# Patient Record
Sex: FEMALE
[De-identification: names, ages, dates, MRNs, and addresses within clinical notes are randomized per-mention and may not be internally consistent; named-entity substitution may affect disease eponyms.]

---

## 2022-03-19 ENCOUNTER — TRANSCRIPTION ENCOUNTER (OUTPATIENT)
Age: 37
End: 2022-03-19

## 2022-03-26 ENCOUNTER — TRANSCRIPTION ENCOUNTER (OUTPATIENT)
Age: 37
End: 2022-03-26

## 2022-09-07 ENCOUNTER — NON-APPOINTMENT (OUTPATIENT)
Age: 37
End: 2022-09-07

## 2023-03-15 ENCOUNTER — APPOINTMENT (OUTPATIENT)
Dept: ANTEPARTUM | Facility: CLINIC | Age: 38
End: 2023-03-15
Payer: COMMERCIAL

## 2023-03-15 ENCOUNTER — ASOB RESULT (OUTPATIENT)
Age: 38
End: 2023-03-15

## 2023-03-15 PROCEDURE — 76825 ECHO EXAM OF FETAL HEART: CPT

## 2023-03-15 PROCEDURE — 93325 DOPPLER ECHO COLOR FLOW MAPG: CPT

## 2023-03-15 PROCEDURE — 76827 ECHO EXAM OF FETAL HEART: CPT

## 2023-04-11 ENCOUNTER — NON-APPOINTMENT (OUTPATIENT)
Age: 38
End: 2023-04-11

## 2023-10-14 ENCOUNTER — NON-APPOINTMENT (OUTPATIENT)
Age: 38
End: 2023-10-14

## 2024-01-16 ENCOUNTER — NON-APPOINTMENT (OUTPATIENT)
Age: 39
End: 2024-01-16

## 2024-01-25 ENCOUNTER — APPOINTMENT (OUTPATIENT)
Dept: ORTHOPEDIC SURGERY | Facility: CLINIC | Age: 39
End: 2024-01-25
Payer: COMMERCIAL

## 2024-01-25 VITALS — BODY MASS INDEX: 30.49 KG/M2 | WEIGHT: 183 LBS | HEIGHT: 65 IN

## 2024-01-25 DIAGNOSIS — Z78.9 OTHER SPECIFIED HEALTH STATUS: ICD-10-CM

## 2024-01-25 PROCEDURE — 72200 X-RAY EXAM SI JOINTS: CPT

## 2024-01-25 PROCEDURE — 99203 OFFICE O/P NEW LOW 30 MIN: CPT

## 2024-01-25 RX ORDER — METHYLPREDNISOLONE 4 MG/1
4 TABLET ORAL
Qty: 1 | Refills: 0 | Status: ACTIVE | COMMUNITY
Start: 2024-01-25 | End: 1900-01-01

## 2024-01-25 RX ORDER — DICLOFENAC SODIUM 75 MG/1
75 TABLET, DELAYED RELEASE ORAL TWICE DAILY
Qty: 60 | Refills: 3 | Status: COMPLETED | COMMUNITY
Start: 2024-01-25 | End: 2024-05-24

## 2024-01-25 NOTE — ASSESSMENT
[FreeTextEntry1] : Recommend MDP. Ice, activity modification. Sitting donut. Diclofenac prn after MDP.  RTO 4 weeks.

## 2024-01-25 NOTE — HISTORY OF PRESENT ILLNESS
[8] : 8 [Dull/Aching] : dull/aching [Localized] : localized [Sharp] : sharp [Constant] : constant [Sitting] : sitting [Full time] : Work status: full time [de-identified] : 1/25/24: 39 yo female with buttock pain since about 1/9/24. She states she slipped on wet floor and landed directly on tile floor. She had immediate pain. There is pain with sitting and walking. She has pain at night. She tried lido patch and motrin. No prior injuries. No change in bowel/bladder. [] : Post Surgical Visit: no [FreeTextEntry1] : Staci [FreeTextEntry5] : Patient slipped and fell in her basement a couple of weeks ago and landed on her tailbone. having difficulty sitting and lying down. no prior hx

## 2024-01-29 ENCOUNTER — TRANSCRIPTION ENCOUNTER (OUTPATIENT)
Age: 39
End: 2024-01-29

## 2024-02-09 ENCOUNTER — APPOINTMENT (OUTPATIENT)
Dept: ORTHOPEDIC SURGERY | Facility: CLINIC | Age: 39
End: 2024-02-09

## 2024-02-29 ENCOUNTER — APPOINTMENT (OUTPATIENT)
Dept: ORTHOPEDIC SURGERY | Facility: CLINIC | Age: 39
End: 2024-02-29
Payer: COMMERCIAL

## 2024-02-29 DIAGNOSIS — M53.3 SACROCOCCYGEAL DISORDERS, NOT ELSEWHERE CLASSIFIED: ICD-10-CM

## 2024-02-29 DIAGNOSIS — S32.2XXA FRACTURE OF COCCYX, INITIAL ENCOUNTER FOR CLOSED FRACTURE: ICD-10-CM

## 2024-02-29 PROCEDURE — 99213 OFFICE O/P EST LOW 20 MIN: CPT | Mod: 25

## 2024-02-29 NOTE — ASSESSMENT
[FreeTextEntry1] : coccyx fracture sustained on 1/9. Mostly improved symptoms with small set back recently. Pain much improved from initial injury - Can use naproxen, tylenol, ice prn - Recommend donut cushion for comfort - Referral to Pelvic Floor Therapy to assist in pain relief - Follow up in 4-6 weeks

## 2024-02-29 NOTE — HISTORY OF PRESENT ILLNESS
[de-identified] : 2/29/24 - Pt is a 39yo female presenting for follow up of mildly displaced coccyx fracture sustained on 1/9 after a fall. She was seen in urgent care, prescribed MDP, diclofenac, and recommended to follow up in 1 month. Pt states overall pain has significantly improved. She recently clenched her butt and felt like things shifted and pain has been slightly more present again. Pain is present with positional changes. No weakness or paresthesias. No symptoms with bowel movements.  [FreeTextEntry5] : 2/29/24 - pt is here today for  spine pain.pt was seen by urgent care, which told her she fractured her tailbone. here to follow up and make sure everything is healing properly.

## 2024-02-29 NOTE — IMAGING
[de-identified] : Back: - No obvious deformity - No pain with palpation of spinous processes or paraspinal musculature - Pain to coccyx - No pain with SI palpation  - Distally neurovascularly intact

## 2024-04-04 ENCOUNTER — APPOINTMENT (OUTPATIENT)
Dept: ORTHOPEDIC SURGERY | Facility: CLINIC | Age: 39
End: 2024-04-04

## 2024-06-04 ENCOUNTER — NON-APPOINTMENT (OUTPATIENT)
Age: 39
End: 2024-06-04

## 2024-11-01 ENCOUNTER — NON-APPOINTMENT (OUTPATIENT)
Age: 39
End: 2024-11-01

## 2025-03-13 ENCOUNTER — NON-APPOINTMENT (OUTPATIENT)
Age: 40
End: 2025-03-13

## 2025-03-21 ENCOUNTER — NON-APPOINTMENT (OUTPATIENT)
Age: 40
End: 2025-03-21

## 2025-07-01 ENCOUNTER — OUTPATIENT (OUTPATIENT)
Dept: OUTPATIENT SERVICES | Facility: HOSPITAL | Age: 40
LOS: 1 days | End: 2025-07-01
Payer: COMMERCIAL

## 2025-07-01 ENCOUNTER — APPOINTMENT (OUTPATIENT)
Dept: ULTRASOUND IMAGING | Facility: IMAGING CENTER | Age: 40
End: 2025-07-01

## 2025-07-01 ENCOUNTER — APPOINTMENT (OUTPATIENT)
Dept: MAMMOGRAPHY | Facility: IMAGING CENTER | Age: 40
End: 2025-07-01

## 2025-07-01 DIAGNOSIS — Z00.8 ENCOUNTER FOR OTHER GENERAL EXAMINATION: ICD-10-CM

## 2025-07-01 PROCEDURE — 76641 ULTRASOUND BREAST COMPLETE: CPT | Mod: 26,50

## 2025-07-01 PROCEDURE — 77066 DX MAMMO INCL CAD BI: CPT | Mod: 26

## 2025-07-01 PROCEDURE — 77062 BREAST TOMOSYNTHESIS BI: CPT | Mod: 26

## 2025-07-01 PROCEDURE — G0279: CPT

## 2025-07-01 PROCEDURE — 76641 ULTRASOUND BREAST COMPLETE: CPT

## 2025-07-01 PROCEDURE — 77066 DX MAMMO INCL CAD BI: CPT
